# Patient Record
Sex: FEMALE | Race: WHITE | NOT HISPANIC OR LATINO | URBAN - METROPOLITAN AREA
[De-identification: names, ages, dates, MRNs, and addresses within clinical notes are randomized per-mention and may not be internally consistent; named-entity substitution may affect disease eponyms.]

---

## 2024-06-16 ENCOUNTER — EMERGENCY (EMERGENCY)
Facility: HOSPITAL | Age: 29
LOS: 1 days | Discharge: HOME | End: 2024-06-16
Attending: EMERGENCY MEDICINE | Admitting: EMERGENCY MEDICINE
Payer: COMMERCIAL

## 2024-06-16 VITALS
TEMPERATURE: 98 F | HEIGHT: 69 IN | OXYGEN SATURATION: 99 % | SYSTOLIC BLOOD PRESSURE: 147 MMHG | HEART RATE: 127 BPM | DIASTOLIC BLOOD PRESSURE: 84 MMHG | WEIGHT: 145.06 LBS | RESPIRATION RATE: 18 BRPM

## 2024-06-16 DIAGNOSIS — Z98.84 BARIATRIC SURGERY STATUS: Chronic | ICD-10-CM

## 2024-06-16 PROCEDURE — 73110 X-RAY EXAM OF WRIST: CPT

## 2024-06-16 PROCEDURE — 73130 X-RAY EXAM OF HAND: CPT

## 2024-06-16 PROCEDURE — 73110 X-RAY EXAM OF WRIST: CPT | Mod: 26,RT

## 2024-06-16 PROCEDURE — 99284 EMERGENCY DEPT VISIT MOD MDM: CPT

## 2024-06-16 PROCEDURE — 73130 X-RAY EXAM OF HAND: CPT | Mod: 26,RT

## 2024-06-16 RX ORDER — PRAZOSIN HCL 2 MG
0 CAPSULE ORAL
Refills: 0 | DISCHARGE

## 2024-06-16 RX ORDER — ESCITALOPRAM OXALATE 10 MG/1
0 TABLET, FILM COATED ORAL
Refills: 0 | DISCHARGE

## 2024-06-16 NOTE — ED ADULT NURSE NOTE - NSFALLUNIVINTERV_ED_ALL_ED
Bed/Stretcher in lowest position, wheels locked, appropriate side rails in place/Call bell, personal items and telephone in reach/Instruct patient to call for assistance before getting out of bed/chair/stretcher/Non-slip footwear applied when patient is off stretcher/[place] to call system/Physically safe environment - no spills, clutter or unnecessary equipment/Purposeful proactive rounding/Room/bathroom lighting operational, light cord in reach

## 2024-06-16 NOTE — ED PROVIDER NOTE - OBJECTIVE STATEMENT
29-year-old female presents to the emergency department for wrist and hand pain.  Patient states that she did not do anything that she could think of to aggravate her right wrist injury however she felt a popping sensation in the right wrist and noticed redness which is now developed into bruising.  This occurred on Friday, 3 days ago.  Patient also noticing numbness and tingling in the right fourth and fifth digits.  Patient states that she lost a significant amount of weight within an 18-month period.  Ever since then, she wakes up every morning with generalized aches and pains.  She has not been diligent about following up or discussing this with her primary doctor.  Patient moved to [place] from [place] and is now establishing care in [place].  No vomiting dizziness shortness of breath.  No tendinous injuries.  No recent antibiotics or fluoroquinolones specifically.

## 2024-06-16 NOTE — ED PROVIDER NOTE - NSFOLLOWUPINSTRUCTIONS_ED_ALL_ED_FT
Contusion    In the Emergency Department today, we did not appreciate any abnormal findings on your xray. We will submit to our radiologist for FINAL review. If there are any new findings or concerning findings that were missed in the Emergency Department, we will notify you at the number provided upon registration.     We will contact you to assist with setting up your follow-up appointments.    A contusion is a deep bruise. Contusions are the result of a blunt injury to tissues and muscle fibers under the skin. The injury causes bleeding under the skin. The skin over the contusion may turn red, blue, purple, or yellow. Minor injuries will give you a painless contusion, but more severe injuries cause contusions that can stay painful and swollen for a few weeks.      Managing pain, stiffness, and swelling    Bag of ice on a towel on the skin.  Use resting, icing, applying pressure (compression), and raising (elevating) the injured area. This is often called the RICE method.  Rest the injured area. Return to your normal activities as told by your health care provider. Ask your health care provider what activities are safe for you.  If directed, put ice on the injured area. To do this:  Put ice in a plastic bag.  Place a towel between your skin and the bag.  Leave the ice on for 20 minutes, 2–3 times a day.  If your skin turns bright red, remove the ice right away to prevent skin damage. The risk of skin damage is higher if you cannot feel pain, heat, or cold.  If directed, apply light compression to the injured area using an elastic bandage. Make sure the bandage is not wrapped too tightly. Remove and reapply the bandage as directed by your health care provider.  If possible, elevate the injured area above the level of your heart while you are sitting or lying down.    General instructions    Take over-the-counter and prescription medicines only as told by your health care provider. (acetaminophen 650mg every 6 hours as needed for pain)      Due to the multiple joint pains and the easy bruising, we recommend follow-up with a hematologist and a rheumatologist.  We will notify you to assist with setting up your follow-up care with the primary care provider as well as the specialist if possible.      Contact a health care provider if:  Your symptoms do not improve after several days of treatment.  Your symptoms get worse.  You have difficulty moving the injured area.  Get help right away if:  You have severe pain.  You have numbness in a hand or foot.  Your hand or foot turns pale or cold.  This information is not intended to replace advice given to you by your health care provider. Make sure you discuss any questions you have with your health care provider.

## 2024-06-16 NOTE — ED PROVIDER NOTE - CLINICAL SUMMARY MEDICAL DECISION MAKING FREE TEXT BOX
29-year-old female presents to the emergency department for wrist and hand pain.  Patient states that she did not do anything that she could think of to aggravate her right wrist injury however she felt a popping sensation in the right wrist and noticed redness which is now developed into bruising.  This occurred on Friday, 3 days ago.  Patient also noticing numbness and tingling in the right fourth and fifth digits.  Patient states that she lost a significant amount of weight within an 18-month period.  Ever since then, she wakes up every morning with generalized aches and pains.  She has not been diligent about following up or discussing this with her primary doctor.  Patient moved to [place] from [place] and is now establishing care in [place].  No vomiting dizziness shortness of breath.  No tendinous injuries.  No recent antibiotics or fluoroquinolones specifically.  Exam as stated.  No acute or emergent concerns however given the history, will obtain x-ray to assess.  Patient needs to establish care.  Needs a primary care doctor and would recommend that back patient follows up with a hematologist for easy bruising and a rheumatologist for multiple joint pains.  Will place information and list that for follow-up.  Patient agreeable to plan.  Patient very emotional that she came in today to receive help and is actually likely to get linked to the proper providers.  Given family the opportunity to voice their concerns.  No additional concerns raised.

## 2024-06-16 NOTE — ED ADULT TRIAGE NOTE - CHIEF COMPLAINT QUOTE
Patient presents to ED with complaint of "right wrist, pain with pressure, redness that started yesterday, also tingling and numbness in the 4th and 5th digit" as per patient. PAtient states she felt a pop in the right wrist when the symptoms began. Alert and oriented x 4. No nausea, vomiting, dizziness or SOB.

## 2024-06-16 NOTE — ED PROVIDER NOTE - PHYSICAL EXAMINATION
General:     NAD, well-nourished, well-appearing  Eyes: PERRL, white sclera  Head:     NC/AT, EOMI  Pharynx: pharynx wnl, oral mucosa moist, no oral lesions  Neck:     trachea midline  Lungs:     CTA b/l  CVS:     RRR  Ext:   no deformities, ROM of right hand normal. Strength normal. Oppositional strength normal. Wrist normal and nontender. No snuffbox tenderness.  Thenar eminence with old bruising.  Tender along the lateralmost portion of the more thenar eminence near the middle of the hand.  Wrist range of motion is normal.  Patient states thumb fourth and fifth digits are numb.  Elbow exam on the right upper extremity normal.  No tenderness in the ulnar groove.   Skin: no rash, Bruising is noted to the thenar eminence of the right hand.  Also small area of bruising to bilateral lower shins.  Neuro: AAOx3, no sensory/motor deficits

## 2024-06-16 NOTE — ED PROVIDER NOTE - PATIENT PORTAL LINK FT
You can access the FollowMyHealth Patient Portal offered by [facility] by registering at the following website: [email or web address]. By joining [employer]’s FollowMyHealth portal, you will also be able to view your health information using other applications (apps) compatible with our system.